# Patient Record
Sex: FEMALE | Race: WHITE | Employment: UNEMPLOYED | URBAN - METROPOLITAN AREA
[De-identification: names, ages, dates, MRNs, and addresses within clinical notes are randomized per-mention and may not be internally consistent; named-entity substitution may affect disease eponyms.]

---

## 2023-06-16 ENCOUNTER — APPOINTMENT (OUTPATIENT)
Dept: GENERAL RADIOLOGY | Age: 58
End: 2023-06-16
Payer: COMMERCIAL

## 2023-06-16 ENCOUNTER — HOSPITAL ENCOUNTER (EMERGENCY)
Age: 58
Discharge: HOME | End: 2023-06-16
Attending: EMERGENCY MEDICINE
Payer: COMMERCIAL

## 2023-06-16 VITALS
BODY MASS INDEX: 45.16 KG/M2 | DIASTOLIC BLOOD PRESSURE: 94 MMHG | HEART RATE: 95 BPM | HEIGHT: 60 IN | TEMPERATURE: 98.6 F | RESPIRATION RATE: 20 BRPM | OXYGEN SATURATION: 96 % | SYSTOLIC BLOOD PRESSURE: 157 MMHG | WEIGHT: 230 LBS

## 2023-06-16 DIAGNOSIS — J22 ACUTE RESPIRATORY INFECTION: ICD-10-CM

## 2023-06-16 DIAGNOSIS — J20.9 ACUTE BRONCHITIS, UNSPECIFIED ORGANISM: ICD-10-CM

## 2023-06-16 DIAGNOSIS — B97.89 VIRAL RESPIRATORY ILLNESS: Primary | ICD-10-CM

## 2023-06-16 DIAGNOSIS — J40 BRONCHITIS: ICD-10-CM

## 2023-06-16 DIAGNOSIS — J98.8 VIRAL RESPIRATORY ILLNESS: Primary | ICD-10-CM

## 2023-06-16 LAB
FLUAV AG SPEC QL: NEGATIVE
FLUBV AG SPEC QL: NEGATIVE
SARS-COV-2 RDRP RESP QL NAA+PROBE: NOT DETECTED
SPECIMEN DESCRIPTION: NORMAL

## 2023-06-16 PROCEDURE — 87635 SARS-COV-2 COVID-19 AMP PRB: CPT

## 2023-06-16 PROCEDURE — 71045 X-RAY EXAM CHEST 1 VIEW: CPT

## 2023-06-16 PROCEDURE — 87804 INFLUENZA ASSAY W/OPTIC: CPT

## 2023-06-16 PROCEDURE — 99284 EMERGENCY DEPT VISIT MOD MDM: CPT

## 2023-06-16 RX ORDER — AZITHROMYCIN 250 MG/1
TABLET, FILM COATED ORAL
Qty: 1 PACKET | Refills: 0 | Status: SHIPPED | OUTPATIENT
Start: 2023-06-16 | End: 2023-06-20

## 2023-06-16 RX ORDER — AMLODIPINE BESYLATE 5 MG/1
1 TABLET ORAL DAILY
COMMUNITY
Start: 2023-04-27

## 2023-06-16 RX ORDER — OMEPRAZOLE 20 MG/1
CAPSULE, DELAYED RELEASE ORAL
COMMUNITY
Start: 2019-06-05

## 2023-06-16 RX ORDER — BENZONATATE 100 MG/1
100 CAPSULE ORAL 3 TIMES DAILY PRN
COMMUNITY

## 2023-06-16 RX ORDER — ATORVASTATIN CALCIUM 40 MG/1
TABLET, FILM COATED ORAL
COMMUNITY
Start: 2022-01-07

## 2023-06-16 RX ORDER — GUAIFENESIN 400 MG/1
400 TABLET ORAL 4 TIMES DAILY PRN
COMMUNITY

## 2023-06-16 RX ORDER — CYCLOBENZAPRINE HCL 5 MG
TABLET ORAL
COMMUNITY
Start: 2020-12-07

## 2023-06-16 RX ORDER — ALBUTEROL SULFATE 90 UG/1
AEROSOL, METERED RESPIRATORY (INHALATION)
COMMUNITY

## 2023-06-16 RX ORDER — VALSARTAN 160 MG/1
TABLET ORAL
COMMUNITY
Start: 2019-06-05

## 2023-06-16 ASSESSMENT — PAIN SCALES - GENERAL: PAINLEVEL_OUTOF10: 8

## 2023-06-16 ASSESSMENT — ENCOUNTER SYMPTOMS
PHOTOPHOBIA: 0
COUGH: 1
VOMITING: 0
DIARRHEA: 0
SINUS PRESSURE: 0
CONSTIPATION: 0
NAUSEA: 0
ABDOMINAL PAIN: 0
SINUS PAIN: 0
WHEEZING: 0
SHORTNESS OF BREATH: 1

## 2023-06-16 ASSESSMENT — PAIN DESCRIPTION - PAIN TYPE: TYPE: ACUTE PAIN

## 2023-06-16 ASSESSMENT — PAIN - FUNCTIONAL ASSESSMENT: PAIN_FUNCTIONAL_ASSESSMENT: 0-10

## 2023-06-16 ASSESSMENT — PAIN DESCRIPTION - LOCATION: LOCATION: HEAD;THROAT;OTHER (COMMENT)

## 2023-06-17 NOTE — ED PROVIDER NOTES
[address]      Pt Name: Duarte Valdovinos  MRN: [id number]  [place] 1965  Date of evaluation: 6/16/2023  Provider: ANA Littlejohn NP    CHIEF COMPLAINT       Chief Complaint   Patient presents with    Cough    Pharyngitis     Pt having sore throat, fever, body aches, congestion, and harsh cough. Pt has had covid in the past and feels similar         HISTORY OF PRESENT ILLNESS   (Location/Symptom, Timing/Onset, Context/Setting, Quality, Duration, Modifying Factors, Severity)  Note limiting factors. Duarte Valdovinos is a 59 y.o. female who presents to the emergency department     HPI -patient reports to the emergency department with a 4-day history of cough, intermittent fevers, shortness of breath. Patient reports that she also had a 12-hour period where she had nausea with diarrhea. Patient reports that she has had COVID multiple times in the past and her symptoms were consistent with COVID infection. She was seen earlier in the day at an urgent care where she was prescribed albuterol, Tessalon, Mucinex. COVID test at that time was negative. Patient advised she did not believe in the negative COVID test and was seeking a second opinion. Patient's lungs are clear however she does have rhonchorous upper airway. Patient's symptoms are more consistent with a viral URI than COVID. Nursing Notes were reviewed. REVIEW OF SYSTEMS    (2-9 systems for level 4, 10 or more for level 5)     Review of Systems   Constitutional:  Negative for activity change, chills, fatigue and fever. HENT:  Negative for sinus pressure and sinus pain. Eyes:  Negative for photophobia and visual disturbance. Respiratory:  Positive for cough and shortness of breath. Negative for wheezing. Cardiovascular: Negative. Negative for chest pain, palpitations and leg swelling.    Gastrointestinal:  Negative for abdominal pain, constipation, diarrhea, nausea

## 2023-06-17 NOTE — ED PROVIDER NOTES
[address] [facility] Emergency Department  [id number] [address] [facility] [address] [facility] [address]  Phone: [phone]  Fax: [phone]      Attending Physician Attestation    Based on the medical record, the care appears appropriate. I was personally available for consultation in the Emergency Department. I did have a discussion with our midlevel provider regarding the care of this patient. I reviewed the mid level provider's note and agree with the documented findings and plan of care. I have reviewed the emergency nurses triage note. I agree with the chief complaint, past medical history, past surgical history, allergies, medications, social and family history as documented unless otherwise noted below. CHIEF COMPLAINT       Chief Complaint   Patient presents with    Cough    Pharyngitis     Pt having sore throat, fever, body aches, congestion, and harsh cough. Pt has had covid in the past and feels similar         PAST MEDICAL HISTORY    has a past medical history of Breast CA ([address] SURGICAL HISTORY      has a past surgical history that includes Breast lumpectomy; Bilateral oophorectomy; and Appendectomy.     CURRENT MEDICATIONS       Discharge Medication List as of 6/16/2023  9:12 PM        CONTINUE these medications which have NOT CHANGED    Details   cyclobenzaprine (FLEXERIL) 5 MG tablet cyclobenzaprine 5 mg tabletHistorical Med      amLODIPine (NORVASC) 5 MG tablet Take 1 tablet by mouth dailyHistorical Med      atorvastatin (LIPITOR) 40 MG tablet atorvastatin 40 mg tabletHistorical Med      omeprazole (PRILOSEC) 20 MG delayed release capsule omeprazole 20 mg capsule,delayed releaseHistorical Med      valsartan (DIOVAN) 160 MG tablet valsartan 160 mg tablet   TAKE 1 TABLET BY MOUTH EVERY DAYHistorical Med      benzonatate (TESSALON) 100 MG capsule Take 1 capsule by mouth 3 times daily as needed for CoughHistorical Med      guaiFENesin 400 MG tablet Take 1 tablet by mouth 4 times daily as needed